# Patient Record
Sex: MALE | Race: WHITE | Employment: OTHER | URBAN - METROPOLITAN AREA
[De-identification: names, ages, dates, MRNs, and addresses within clinical notes are randomized per-mention and may not be internally consistent; named-entity substitution may affect disease eponyms.]

---

## 2017-02-10 RX ORDER — EPINEPHRINE 0.1 MG/ML
1 INJECTION INTRACARDIAC; INTRAVENOUS
Status: CANCELLED | OUTPATIENT
Start: 2017-02-10 | End: 2017-02-10

## 2017-02-10 RX ORDER — ATROPINE SULFATE 0.1 MG/ML
0.5 INJECTION INTRAVENOUS
Status: CANCELLED | OUTPATIENT
Start: 2017-02-10 | End: 2017-02-10

## 2017-02-10 RX ORDER — DEXTROMETHORPHAN/PSEUDOEPHED 2.5-7.5/.8
1.2 DROPS ORAL
Status: CANCELLED | OUTPATIENT
Start: 2017-02-10

## 2017-02-17 ENCOUNTER — HOSPITAL ENCOUNTER (OUTPATIENT)
Age: 62
Setting detail: OUTPATIENT SURGERY
Discharge: HOME | End: 2017-02-17
Attending: INTERNAL MEDICINE | Admitting: INTERNAL MEDICINE
Payer: COMMERCIAL

## 2017-02-17 ENCOUNTER — SURGERY (OUTPATIENT)
Age: 62
End: 2017-02-17

## 2017-02-17 VITALS
DIASTOLIC BLOOD PRESSURE: 75 MMHG | HEIGHT: 71 IN | HEART RATE: 64 BPM | TEMPERATURE: 96.8 F | WEIGHT: 213 LBS | SYSTOLIC BLOOD PRESSURE: 119 MMHG | RESPIRATION RATE: 16 BRPM | BODY MASS INDEX: 29.82 KG/M2 | OXYGEN SATURATION: 94 %

## 2017-02-17 LAB — GLUCOSE BLD STRIP.AUTO-MCNC: 105 MG/DL (ref 70–110)

## 2017-02-17 PROCEDURE — 74011250636 HC RX REV CODE- 250/636: Performed by: INTERNAL MEDICINE

## 2017-02-17 PROCEDURE — 74011250636 HC RX REV CODE- 250/636

## 2017-02-17 PROCEDURE — 76040000007: Performed by: INTERNAL MEDICINE

## 2017-02-17 PROCEDURE — 88305 TISSUE EXAM BY PATHOLOGIST: CPT | Performed by: INTERNAL MEDICINE

## 2017-02-17 PROCEDURE — 82962 GLUCOSE BLOOD TEST: CPT

## 2017-02-17 PROCEDURE — 77030013991 HC SNR POLYP ENDOSC BSC -A: Performed by: INTERNAL MEDICINE

## 2017-02-17 PROCEDURE — 77030020256 HC SOL INJ NACL 0.9%  500ML: Performed by: INTERNAL MEDICINE

## 2017-02-17 RX ORDER — SODIUM CHLORIDE 9 MG/ML
100 INJECTION, SOLUTION INTRAVENOUS CONTINUOUS
Status: DISCONTINUED | OUTPATIENT
Start: 2017-02-17 | End: 2017-02-17 | Stop reason: HOSPADM

## 2017-02-17 RX ORDER — METFORMIN HYDROCHLORIDE 500 MG/1
1000 TABLET ORAL 2 TIMES DAILY WITH MEALS
COMMUNITY

## 2017-02-17 RX ORDER — PIOGLITAZONEHYDROCHLORIDE 30 MG/1
30 TABLET ORAL DAILY
COMMUNITY

## 2017-02-17 RX ORDER — NALOXONE HYDROCHLORIDE 0.4 MG/ML
0.4 INJECTION, SOLUTION INTRAMUSCULAR; INTRAVENOUS; SUBCUTANEOUS
Status: DISCONTINUED | OUTPATIENT
Start: 2017-02-17 | End: 2017-02-17 | Stop reason: HOSPADM

## 2017-02-17 RX ORDER — FLUMAZENIL 0.1 MG/ML
0.2 INJECTION INTRAVENOUS
Status: DISCONTINUED | OUTPATIENT
Start: 2017-02-17 | End: 2017-02-17 | Stop reason: HOSPADM

## 2017-02-17 RX ORDER — TADALAFIL 20 MG/1
20 TABLET ORAL AS NEEDED
COMMUNITY

## 2017-02-17 RX ORDER — MIDAZOLAM HYDROCHLORIDE 1 MG/ML
.5-5 INJECTION, SOLUTION INTRAMUSCULAR; INTRAVENOUS
Status: DISCONTINUED | OUTPATIENT
Start: 2017-02-17 | End: 2017-02-17 | Stop reason: HOSPADM

## 2017-02-17 RX ORDER — SILDENAFIL 50 MG/1
50 TABLET, FILM COATED ORAL AS NEEDED
COMMUNITY
End: 2021-03-18

## 2017-02-17 RX ORDER — FENTANYL CITRATE 50 UG/ML
100 INJECTION, SOLUTION INTRAMUSCULAR; INTRAVENOUS
Status: DISCONTINUED | OUTPATIENT
Start: 2017-02-17 | End: 2017-02-17 | Stop reason: HOSPADM

## 2017-02-17 RX ORDER — PRAVASTATIN SODIUM 40 MG/1
40 TABLET ORAL
COMMUNITY

## 2017-02-17 RX ORDER — ASPIRIN 81 MG/1
81 TABLET ORAL DAILY
COMMUNITY

## 2017-02-17 RX ADMIN — FENTANYL CITRATE 100 MCG: 50 INJECTION, SOLUTION INTRAMUSCULAR; INTRAVENOUS at 14:56

## 2017-02-17 RX ADMIN — MIDAZOLAM HYDROCHLORIDE 2 MG: 1 INJECTION, SOLUTION INTRAMUSCULAR; INTRAVENOUS at 14:57

## 2017-02-17 RX ADMIN — SODIUM CHLORIDE 100 ML/HR: 900 INJECTION, SOLUTION INTRAVENOUS at 13:45

## 2017-02-17 RX ADMIN — MIDAZOLAM HYDROCHLORIDE 3 MG: 1 INJECTION, SOLUTION INTRAMUSCULAR; INTRAVENOUS at 14:56

## 2017-02-17 NOTE — PROCEDURES
Former[facility]  Colonoscopy Procedure Report  _______________________________________________________  Patient: Yudy Herron                                         Attending Physician: Tamir Horn MD    Patient ID: [id number]                                      Referring Physician: Winsome Shi MD    Exam Date: February 17, 2017 _______________________________________________________      Introduction: A  62 y.o. male patient, presents for outpatient Colonoscopy    Indications: second Screen colon cancer, average risk and asymptomatic. Last colonoscopy done 10 years ago    Consent: The benefits, risks, and alternatives to the procedure were discussed and informed consent was obtained from the patient. Preparation: EKG, pulse, pulse oximetry and blood pressure were monitored throughout the procedure. ASA Classification: Class 2 - . The heart is an S1-S2 and regular heart rate and rhythm. Lungs are clear to auscultation and percussion. Abdomen is soft, nondistended, and nontender. Mental Status: awake, alert, and oriented to person, place, and time    Medications:  · Fentanyl 100 mcg IV before procedure. · Versed 5 mg IV throughout the procedure. Rectal Exam: Normal Rectal Exam. No Blood. Prostate not enlarged. Pathology Specimens: 2 specimens removed. Procedure: The colonoscope was passed with ease through the anus under direct visualization and advanced to the cecum and 5 cm inside the terminal ileum. The scope was withdrawn and the mucosa was carefully examined. The quality of the preparation was good to fair. The views were good. The patient's toleration of the procedure was excellent. The exam was done twice to the cecum. Total time is 20 minutes and withdrawal time is 15 minutes. Findings:  Slightly long and redundant colon  Rectum:   Normal  Sigmoid:   Slightly tortuous and redundant sigmoid colon.    Descending Colon:   Normal  Transverse Colon:   4 mm sessile polyp in the transverse colon cold snared. Ascending Colon:   6 mm flat polyp in the mid ascending colon cold snared   Cecum:   Normal  Terminal Ileum:   Normal    Unplanned Events: There were no unplanned events. Estimated Blood Loss: None  Impressions:    Slightly tortuous and redundant sigmoid colon. One 6 mm flat polyp in the mid ascending colon cold snared and one 4 mm sessile polyp in the transverse colon cold snared. Normal Mucosa. No diverticula found. Complications: None; patient tolerated the procedure well. Recommendations:  · Discharge home when standard parameters are met. · Resume a high fiber diet. · Colonoscopy recommendation in 5 years with a better bowel prep.      Procedure Codes:    · [place] [[id number]]    Endoscope Information:  Model Number(s)    [id number]     Assistant: None      Signed By: Yocasta Oshea MD Date: February 17, 2017

## 2017-02-17 NOTE — DISCHARGE INSTRUCTIONS
Srikanth Lawn  [id number]  1955    COLON DISCHARGE INSTRUCTIONS    Discomfort:  Redness at IV site- apply warm compress to area; if redness or soreness persist- contact your physician  There may be a slight amount of blood passed from the rectum  Gaseous discomfort- walking, belching will help relieve any discomfort  You may not operate a vehicle til the next day. You may not engage in an occupation involving machinery or appliances til the next day. You may not drink alcoholic beverages til the next day. DIET:   High fiber diet. ACTIVITY:  You may not  resume your normal daily activities til the next day. it is recommended that you spend the remainder of the day resting -  avoid any strenuous activity. CALL M.D.  IF ANY SIGN OF:   Increasing pain, nausea, vomiting  Abdominal distension (swelling)  New increased bleeding (oral or rectal)  Fever (chills)  Pain in chest area  Bloody discharge from nose or mouth  Shortness of breath    You may not  take any Advil, Aspirin, Ibuprofen, Motrin, Aleve, or Goodys for 5 days, ONLY  Tylenol as needed for pain. Post procedure diagnosis:  POLYPS,     Follow-up Instructions: Your follow up colonoscopy will be in 5 years with a better bowel prep. We will notify you the results of your biopsy by letter within 2 weeks.     Jessenia Le MD  February 17, 2017       DISCHARGE SUMMARY from Nurse    The following personal items collected during your admission are returned to you:   Dental Appliance: Dental Appliances: None  Vision: Visual Aid: Glasses  Hearing Aid:    Jewelry:    Clothing:    Other Valuables:    Valuables sent to safe:              PATIENT INSTRUCTIONS:    After general anesthesia or intravenous sedation, for 24 hours or while taking prescription Narcotics:  · Limit your activities  · Do not drive and operate hazardous machinery  · Do not make important personal or business decisions  · Do  not drink alcoholic beverages  · If you have not urinated within 8 hours after discharge, please contact your surgeon on call. Report the following to your surgeon:  · Excessive pain, swelling, redness or odor of or around the surgical area  · Temperature over 100.5  · Nausea and vomiting lasting longer than 4 hours or if unable to take medications  · Any signs of decreased circulation or nerve impairment to extremity: change in color, persistent  numbness, tingling, coldness or increase pain  · Any questions      No orders of the defined types were placed in this encounter. What to do at Home:  Recommended activity: as above,     If you experience any of the following symptoms as above, please follow up with Dr. Renny Rivers. *  Please give a list of your current medications to your Primary Care Provider. *  Please update this list whenever your medications are discontinued, doses are      changed, or new medications (including over-the-counter products) are added. *  Please carry medication information at all times in case of emergency situations. These are general instructions for a healthy lifestyle:    No smoking/ No tobacco products/ Avoid exposure to second hand smoke    Surgeon General's Warning:  Quitting smoking now greatly reduces serious risk to your health. Obesity, smoking, and sedentary lifestyle greatly increases your risk for illness    A healthy diet, regular physical exercise & weight monitoring are important for maintaining a healthy lifestyle    You may be retaining fluid if you have a history of heart failure or if you experience any of the following symptoms:  Weight gain of 3 pounds or more overnight or 5 pounds in a week, increased swelling in our hands or feet or shortness of breath while lying flat in bed. Please call your doctor as soon as you notice any of these symptoms; do not wait until your next office visit.     Recognize signs and symptoms of STROKE:    F-face looks uneven    A-arms unable to move or move unevenly    S-speech slurred or non-existent    T-time-call 911 as soon as signs and symptoms begin-DO NOT go       Back to bed or wait to see if you get better-TIME IS BRAIN. The discharge information has been reviewed with the patient and spouse. The patient and spouse verbalized understanding. Warning Signs of HEART ATTACK     Call 911 if you have these symptoms:   Chest discomfort. Most heart attacks involve discomfort in the center of the chest that lasts more than a few minutes, or that goes away and comes back. It can feel like uncomfortable pressure, squeezing, fullness, or pain.  Discomfort in other areas of the upper body. Symptoms can include pain or discomfort in one or both arms, the back, neck, jaw, or stomach.  Shortness of breath with or without chest discomfort.  Other signs may include breaking out in a cold sweat, nausea, or lightheadedness. Don't wait more than five minutes to call 911 - MINUTES MATTER! Fast action can save your life. Calling 911 is almost always the fastest way to get lifesaving treatment. Emergency Medical Services staff can begin treatment when they arrive -- up to an hour sooner than if someone gets to the hospital by car. The discharge information has been reviewed with the patient and caregiver. The patient and caregiver verbalized understanding. Discharge medications reviewed with the patient and guardian and appropriate educational materials and side effects teaching were provided.     Patient armband removed and shredded

## 2017-02-17 NOTE — IP AVS SNAPSHOT
Afshin Thornton 
 
 
 [address] [place] [id number] 
[phone] Patient: Jorge Luis Amaral MRN: [id number] PII:[id number]/4378 You are allergic to the following Allergen Reactions Pcn (Penicillins) Other (comments)  
 recieved injection and the area swelled and left a scar Zocor (Simvastatin) Myalgia Recent Documentation Height Weight BMI Smoking Status 1.803 m 96.6 kg 29.71 kg/m2 Never Smoker About your hospitalization You were admitted on:  February 17, 2017 You last received care in the:  [facility] ENDOSCOPY You were discharged on:  February 17, 2017 Unit phone number:  [phone] Why you were hospitalized Your primary diagnosis was:  Not on File Providers Seen During Your Hospitalizations Provider Role Specialty Primary office phone Robert Raza MD Attending Provider Gastroenterology [phone] Your Primary Care Physician (PCP) Primary Care Physician Office Phone Office Fax [facility] [address] [place] [address], [address] [place] [address] [place] [phone] Follow-up Information Follow up With Details Comments Contact Info Didi Vines MD   [address] 
[phone] Current Discharge Medication List  
  
CONTINUE these medications which have NOT CHANGED Dose & Instructions Dispensing Information Comments Morning Noon Evening Bedtime ACTOS 30 mg tablet Generic drug:  pioglitazone Your next dose is: Today, Tomorrow Other:  _________ Dose:  30 mg Take 30 mg by mouth daily. Refills:  0  
     
   
   
   
  
 aspirin delayed-release 81 mg tablet Your next dose is: Today, Tomorrow Other:  _________ Dose:  81 mg Take 81 mg by mouth daily. Refills:  0 CIALIS 20 mg tablet Generic drug:  tadalafil Your next dose is: Today, Tomorrow Other:  _________ Dose:  20 mg Take 20 mg by mouth as needed. Refills:  0  
     
   
   
   
  
 metFORMIN 500 mg tablet Commonly known as:  GLUCOPHAGE Your next dose is: Today, Tomorrow Other:  _________ Dose:  500 mg Take 500 mg by mouth two (2) times daily (with meals). Refills:  0 PRAVACHOL 40 mg tablet Generic drug:  pravastatin Your next dose is: Today, Tomorrow Other:  _________ Dose:  40 mg Take 40 mg by mouth nightly. Refills:  0 VIAGRA 50 mg tablet Generic drug:  sildenafil citrate Your next dose is: Today, Tomorrow Other:  _________ Dose:  50 mg Take 50 mg by mouth as needed. Refills:  0 Discharge Instructions Jazminjessica Harman [id number] 
1955 COLON DISCHARGE INSTRUCTIONS Discomfort: 
Redness at IV site- apply warm compress to area; if redness or soreness persist- contact your physician There may be a slight amount of blood passed from the rectum Gaseous discomfort- walking, belching will help relieve any discomfort You may not operate a vehicle til the next day. You may not engage in an occupation involving machinery or appliances til the next day. You may not drink alcoholic beverages til the next day. DIET: 
 High fiber diet. ACTIVITY: 
You may not  resume your normal daily activities til the next day. it is recommended that you spend the remainder of the day resting -  avoid any strenuous activity. CALL BENY Luu ANY SIGN OF: Increasing pain, nausea, vomiting Abdominal distension (swelling) New increased bleeding (oral or rectal) Fever (chills) Pain in chest area Bloody discharge from nose or mouth Shortness of breath You may not  take any Advil, Aspirin, Ibuprofen, Motrin, Aleve, or Goodys for 5 days, ONLY  Tylenol as needed for pain. Post procedure diagnosis:  POLYPS, Follow-up Instructions: Your follow up colonoscopy will be in 5 years with a better bowel prep. We will notify you the results of your biopsy by letter within 2 weeks. Gareth Alfonso MD 
February 17, 2017 DISCHARGE SUMMARY from Nurse The following personal items collected during your admission are returned to you:  
Dental Appliance: Dental Appliances: None Vision: Visual Aid: Glasses Hearing Aid:   
Jewelry:   
Clothing:   
Other Valuables:   
Valuables sent to safe:   
 
 
 
 
 
PATIENT INSTRUCTIONS: 
 
 
F-face looks uneven A-arms unable to move or move unevenly S-speech slurred or non-existent T-time-call 911 as soon as signs and symptoms begin-DO NOT go Back to bed or wait to see if you get better-TIME IS BRAIN. The discharge information has been reviewed with the patient and spouse. The patient and spouse verbalized understanding. Warning Signs of HEART ATTACK Call 911 if you have these symptoms: 
? Chest discomfort. Most heart attacks involve discomfort in the center of the chest that lasts more than a few minutes, or that goes away and comes back. It can feel like uncomfortable pressure, squeezing, fullness, or pain. ? Discomfort in other areas of the upper body. Symptoms can include pain or discomfort in one or both arms, the back, neck, jaw, or stomach. ? Shortness of breath with or without chest discomfort. ? Other signs may include breaking out in a cold sweat, nausea, or lightheadedness. Don't wait more than five minutes to call [address]!  Fast action can save your life. Calling 911 is almost always the fastest way to get lifesaving treatment. Emergency Medical Services staff can begin treatment when they arrive  up to an hour sooner than if someone gets to the hospital by car. The discharge information has been reviewed with the patient and caregiver. The patient and caregiver verbalized understanding. Discharge medications reviewed with the patient and guardian and appropriate educational materials and side effects teaching were provided. Patient armband removed and shredded Discharge Orders None Introducing [facility]! Alexus Abe introduces [employer] patient portal. Now you can access parts of your medical record, email your doctor's office, and request medication refills online. 1. In your internet browser, go to [email or web address] 2. Click on the First Time User? Click Here link in the Sign In box. You will see the New Member Sign Up page. 3. Enter your [employer] Access Code exactly as it appears below. You will not need to use this code after youve completed the sign-up process. If you do not sign up before the expiration date, you must request a new code. · [employer] Access Code: [email or web address] Expires: 5/10/2017  6:15 AM 
 
4. Enter the last four digits of your Social Security Number (xxxx) and Date of Birth (mm/dd/yyyy) as indicated and click Submit. You will be taken to the next sign-up page. 5. Create a [employer] ID. This will be your [employer] login ID and cannot be changed, so think of one that is secure and easy to remember. 6. Create a [employer] password. You can change your password at any time. 7. Enter your Password Reset Question and Answer. This can be used at a later time if you forget your password. 8. Enter your e-mail address. You will receive e-mail notification when new information is available in [address] 9. Click Sign Up. You can now view and download portions of your medical record. 10. Click the Download Summary menu link to download a portable copy of your medical information. If you have questions, please visit the Frequently Asked Questions section of the [employer] website. Remember, [employer] is NOT to be used for urgent needs. For medical emergencies, dial 911. Now available from your iPhone and Android! General Information Please provide this summary of care documentation to your next provider. Patient Signature:  ____________________________________________________________ Date:  ____________________________________________________________  
  
[facility] Provider Signature:  ____________________________________________________________ Date:  ____________________________________________________________

## 2019-12-13 ENCOUNTER — HOSPITAL ENCOUNTER (OUTPATIENT)
Dept: CT IMAGING | Age: 64
Discharge: HOME | End: 2019-12-13
Attending: OPHTHALMOLOGY
Payer: COMMERCIAL

## 2019-12-13 DIAGNOSIS — H53.482: ICD-10-CM

## 2019-12-13 LAB — CREAT UR-MCNC: 0.8 MG/DL (ref 0.6–1.3)

## 2019-12-13 PROCEDURE — 82565 ASSAY OF CREATININE: CPT

## 2019-12-13 PROCEDURE — 70470 CT HEAD/BRAIN W/O & W/DYE: CPT

## 2019-12-16 PROCEDURE — 74011636320 HC RX REV CODE- 636/320: Performed by: OPHTHALMOLOGY

## 2019-12-16 RX ADMIN — IOPAMIDOL 100 ML: 612 INJECTION, SOLUTION INTRAVENOUS at 08:00

## 2021-03-18 ENCOUNTER — HOSPITAL ENCOUNTER (EMERGENCY)
Age: 66
Discharge: HOME | End: 2021-03-19
Attending: EMERGENCY MEDICINE
Payer: MEDICARE

## 2021-03-18 DIAGNOSIS — N20.0 KIDNEY STONE: Primary | ICD-10-CM

## 2021-03-18 LAB
BASOPHILS # BLD: 0 K/UL (ref 0–0.1)
BASOPHILS NFR BLD: 0 % (ref 0–2)
DIFFERENTIAL METHOD BLD: ABNORMAL
EOSINOPHIL # BLD: 0 K/UL (ref 0–0.4)
EOSINOPHIL NFR BLD: 0 % (ref 0–5)
ERYTHROCYTE [DISTWIDTH] IN BLOOD BY AUTOMATED COUNT: 13.9 % (ref 11.6–14.5)
HCT VFR BLD AUTO: 42.5 % (ref 36–48)
HGB BLD-MCNC: 14.2 G/DL (ref 13–16)
LYMPHOCYTES # BLD: 0.8 K/UL (ref 0.9–3.6)
LYMPHOCYTES NFR BLD: 8 % (ref 21–52)
MCH RBC QN AUTO: 31.7 PG (ref 24–34)
MCHC RBC AUTO-ENTMCNC: 33.4 G/DL (ref 31–37)
MCV RBC AUTO: 94.9 FL (ref 74–97)
MONOCYTES # BLD: 0.8 K/UL (ref 0.05–1.2)
MONOCYTES NFR BLD: 8 % (ref 3–10)
NEUTS SEG # BLD: 8.5 K/UL (ref 1.8–8)
NEUTS SEG NFR BLD: 84 % (ref 40–73)
PLATELET # BLD AUTO: 255 K/UL (ref 135–420)
PMV BLD AUTO: 10 FL (ref 9.2–11.8)
RBC # BLD AUTO: 4.48 M/UL (ref 4.7–5.5)
WBC # BLD AUTO: 10.1 K/UL (ref 4.6–13.2)

## 2021-03-18 PROCEDURE — 80053 COMPREHEN METABOLIC PANEL: CPT

## 2021-03-18 PROCEDURE — 85025 COMPLETE CBC W/AUTO DIFF WBC: CPT

## 2021-03-18 PROCEDURE — 96375 TX/PRO/DX INJ NEW DRUG ADDON: CPT

## 2021-03-18 PROCEDURE — 96374 THER/PROPH/DIAG INJ IV PUSH: CPT

## 2021-03-18 PROCEDURE — 83690 ASSAY OF LIPASE: CPT

## 2021-03-18 PROCEDURE — 99283 EMERGENCY DEPT VISIT LOW MDM: CPT

## 2021-03-18 PROCEDURE — 81001 URINALYSIS AUTO W/SCOPE: CPT

## 2021-03-18 PROCEDURE — 87086 URINE CULTURE/COLONY COUNT: CPT

## 2021-03-18 PROCEDURE — 96361 HYDRATE IV INFUSION ADD-ON: CPT

## 2021-03-18 RX ORDER — BIOTIN 5 MG
5 TABLET ORAL DAILY
COMMUNITY

## 2021-03-18 RX ORDER — TESTOSTERONE 10 MG/.5G
GEL, METERED TOPICAL
COMMUNITY

## 2021-03-19 ENCOUNTER — APPOINTMENT (OUTPATIENT)
Dept: CT IMAGING | Age: 66
End: 2021-03-19
Attending: EMERGENCY MEDICINE
Payer: MEDICARE

## 2021-03-19 VITALS
WEIGHT: 208 LBS | SYSTOLIC BLOOD PRESSURE: 160 MMHG | TEMPERATURE: 98.4 F | RESPIRATION RATE: 16 BRPM | BODY MASS INDEX: 29.12 KG/M2 | HEIGHT: 71 IN | OXYGEN SATURATION: 99 % | HEART RATE: 77 BPM | DIASTOLIC BLOOD PRESSURE: 69 MMHG

## 2021-03-19 LAB
ALBUMIN SERPL-MCNC: 3.3 G/DL (ref 3.4–5)
ALBUMIN SERPL-MCNC: 3.9 G/DL (ref 3.4–5)
ALBUMIN/GLOB SERPL: 1.2 {RATIO} (ref 0.8–1.7)
ALBUMIN/GLOB SERPL: 1.2 {RATIO} (ref 0.8–1.7)
ALP SERPL-CCNC: 53 U/L (ref 45–117)
ALP SERPL-CCNC: 66 U/L (ref 45–117)
ALT SERPL-CCNC: 20 U/L (ref 16–61)
ALT SERPL-CCNC: 24 U/L (ref 16–61)
AMORPH CRY URNS QL MICRO: ABNORMAL
ANION GAP SERPL CALC-SCNC: 4 MMOL/L (ref 3–18)
ANION GAP SERPL CALC-SCNC: 5 MMOL/L (ref 3–18)
APPEARANCE UR: ABNORMAL
AST SERPL-CCNC: 14 U/L (ref 10–38)
AST SERPL-CCNC: 14 U/L (ref 10–38)
BACTERIA URNS QL MICRO: ABNORMAL /HPF
BILIRUB SERPL-MCNC: 1 MG/DL (ref 0.2–1)
BILIRUB SERPL-MCNC: 1.1 MG/DL (ref 0.2–1)
BILIRUB UR QL: NEGATIVE
BUN SERPL-MCNC: 14 MG/DL (ref 7–18)
BUN SERPL-MCNC: 15 MG/DL (ref 7–18)
BUN/CREAT SERPL: 10 (ref 12–20)
BUN/CREAT SERPL: 9 (ref 12–20)
CALCIUM SERPL-MCNC: 8.6 MG/DL (ref 8.5–10.1)
CALCIUM SERPL-MCNC: 9.5 MG/DL (ref 8.5–10.1)
CHLORIDE SERPL-SCNC: 101 MMOL/L (ref 100–111)
CHLORIDE SERPL-SCNC: 106 MMOL/L (ref 100–111)
CO2 SERPL-SCNC: 27 MMOL/L (ref 21–32)
CO2 SERPL-SCNC: 29 MMOL/L (ref 21–32)
COLOR UR: YELLOW
CREAT SERPL-MCNC: 1.46 MG/DL (ref 0.6–1.3)
CREAT SERPL-MCNC: 1.53 MG/DL (ref 0.6–1.3)
EPITH CASTS URNS QL MICRO: ABNORMAL /LPF (ref 0–5)
GLOBULIN SER CALC-MCNC: 2.7 G/DL (ref 2–4)
GLOBULIN SER CALC-MCNC: 3.2 G/DL (ref 2–4)
GLUCOSE SERPL-MCNC: 121 MG/DL (ref 74–99)
GLUCOSE SERPL-MCNC: 143 MG/DL (ref 74–99)
GLUCOSE UR STRIP.AUTO-MCNC: 500 MG/DL
HGB UR QL STRIP: NEGATIVE
KETONES UR QL STRIP.AUTO: 15 MG/DL
LEUKOCYTE ESTERASE UR QL STRIP.AUTO: NEGATIVE
LIPASE SERPL-CCNC: 55 U/L (ref 73–393)
NITRITE UR QL STRIP.AUTO: NEGATIVE
PH UR STRIP: 7 [PH] (ref 5–8)
POTASSIUM SERPL-SCNC: 4.2 MMOL/L (ref 3.5–5.5)
POTASSIUM SERPL-SCNC: 4.3 MMOL/L (ref 3.5–5.5)
PROT SERPL-MCNC: 6 G/DL (ref 6.4–8.2)
PROT SERPL-MCNC: 7.1 G/DL (ref 6.4–8.2)
PROT UR STRIP-MCNC: 30 MG/DL
RBC #/AREA URNS HPF: NEGATIVE /HPF (ref 0–5)
SODIUM SERPL-SCNC: 135 MMOL/L (ref 136–145)
SODIUM SERPL-SCNC: 137 MMOL/L (ref 136–145)
SP GR UR REFRACTOMETRY: 1.02 (ref 1–1.03)
UROBILINOGEN UR QL STRIP.AUTO: 1 EU/DL (ref 0.2–1)
WBC URNS QL MICRO: ABNORMAL /HPF (ref 0–5)

## 2021-03-19 PROCEDURE — 96376 TX/PRO/DX INJ SAME DRUG ADON: CPT

## 2021-03-19 PROCEDURE — 74177 CT ABD & PELVIS W/CONTRAST: CPT

## 2021-03-19 PROCEDURE — 96361 HYDRATE IV INFUSION ADD-ON: CPT

## 2021-03-19 PROCEDURE — 96374 THER/PROPH/DIAG INJ IV PUSH: CPT

## 2021-03-19 PROCEDURE — 74011250636 HC RX REV CODE- 250/636: Performed by: EMERGENCY MEDICINE

## 2021-03-19 PROCEDURE — 80053 COMPREHEN METABOLIC PANEL: CPT

## 2021-03-19 PROCEDURE — 74011000636 HC RX REV CODE- 636: Performed by: EMERGENCY MEDICINE

## 2021-03-19 PROCEDURE — 74011250637 HC RX REV CODE- 250/637: Performed by: EMERGENCY MEDICINE

## 2021-03-19 RX ORDER — TAMSULOSIN HYDROCHLORIDE 0.4 MG/1
0.4 CAPSULE ORAL ONCE
Status: COMPLETED | OUTPATIENT
Start: 2021-03-19 | End: 2021-03-19

## 2021-03-19 RX ORDER — ONDANSETRON 4 MG/1
4 TABLET, ORALLY DISINTEGRATING ORAL
Qty: 10 TAB | Refills: 0 | Status: SHIPPED | OUTPATIENT
Start: 2021-03-19

## 2021-03-19 RX ORDER — MORPHINE SULFATE 2 MG/ML
2 INJECTION, SOLUTION INTRAMUSCULAR; INTRAVENOUS
Status: COMPLETED | OUTPATIENT
Start: 2021-03-19 | End: 2021-03-19

## 2021-03-19 RX ORDER — HYDROCODONE BITARTRATE AND ACETAMINOPHEN 5; 325 MG/1; MG/1
1 TABLET ORAL
Qty: 10 TAB | Refills: 0 | Status: SHIPPED | OUTPATIENT
Start: 2021-03-19 | End: 2021-03-23

## 2021-03-19 RX ORDER — NITROFURANTOIN 25; 75 MG/1; MG/1
100 CAPSULE ORAL 2 TIMES DAILY
Qty: 6 CAP | Refills: 0 | Status: SHIPPED | OUTPATIENT
Start: 2021-03-19 | End: 2021-03-23 | Stop reason: CLARIF

## 2021-03-19 RX ORDER — MORPHINE SULFATE 4 MG/ML
4 INJECTION INTRAVENOUS
Status: COMPLETED | OUTPATIENT
Start: 2021-03-19 | End: 2021-03-19

## 2021-03-19 RX ORDER — TAMSULOSIN HYDROCHLORIDE 0.4 MG/1
0.4 CAPSULE ORAL DAILY
Qty: 7 CAP | Refills: 0 | Status: SHIPPED | OUTPATIENT
Start: 2021-03-19 | End: 2021-03-26

## 2021-03-19 RX ADMIN — IOPAMIDOL 70 ML: 612 INJECTION, SOLUTION INTRAVENOUS at 02:46

## 2021-03-19 RX ADMIN — MORPHINE SULFATE 2 MG: 2 INJECTION, SOLUTION INTRAMUSCULAR; INTRAVENOUS at 01:40

## 2021-03-19 RX ADMIN — SODIUM CHLORIDE 1000 ML: 900 INJECTION, SOLUTION INTRAVENOUS at 04:28

## 2021-03-19 RX ADMIN — MORPHINE SULFATE 4 MG: 4 INJECTION INTRAVENOUS at 04:31

## 2021-03-19 RX ADMIN — SODIUM CHLORIDE 1000 ML: 900 INJECTION, SOLUTION INTRAVENOUS at 01:40

## 2021-03-19 RX ADMIN — TAMSULOSIN HYDROCHLORIDE 0.4 MG: 0.4 CAPSULE ORAL at 04:28

## 2021-03-19 NOTE — ED PROVIDER NOTES
EMERGENCY DEPARTMENT HISTORY AND PHYSICAL EXAM    Date: 3/18/2021  Patient Name: Jay Francisco    History of Presenting Illness     Chief Complaint   Patient presents with    Abdominal Pain         History Provided By: Patient    0100  Jay Francisco is a 73 y.o. male with PMHX of diabetes, GERD who presents to the emergency department C/O lower abdominal pain ongoing for the past day. The patient reports that he ate toast this morning with some jam and has since then not had any desire to eat. He reports pain however denies nausea, blood in the stool, chest pain, fever, chills, shortness of breath. States that the pain is primarily on the left side. Denies history of kidney stones. No inciting or relieving factors. PCP: Jennie Montoya MD    Current Outpatient Medications   Medication Sig Dispense Refill    nitrofurantoin, macrocrystal-monohydrate, (Macrobid) 100 mg capsule Take 1 Cap by mouth two (2) times a day for 3 days. 6 Cap 0    ondansetron (Zofran ODT) 4 mg disintegrating tablet Take 1 Tab by mouth every eight (8) hours as needed for Nausea or Vomiting for up to 10 doses. 10 Tab 0    HYDROcodone-acetaminophen (Norco) 5-325 mg per tablet Take 1 Tab by mouth every six (6) hours as needed for Pain for up to 10 doses. Max Daily Amount: 4 Tabs. 10 Tab 0    tamsulosin (Flomax) 0.4 mg capsule Take 1 Cap by mouth daily for 7 days. 7 Cap 0    testosterone Nestora Blight) 10 mg/0.5 gram /actuation glpm by TransDERmal route.  biotin (VITAMIN B7) 5 mg tablet Take 5 mg by mouth daily.  pravastatin (PRAVACHOL) 40 mg tablet Take 40 mg by mouth nightly.  pioglitazone (ACTOS) 30 mg tablet Take 30 mg by mouth daily.  metFORMIN (GLUCOPHAGE) 500 mg tablet Take 1,000 mg by mouth two (2) times daily (with meals).  aspirin delayed-release 81 mg tablet Take 81 mg by mouth daily.  tadalafil (CIALIS) 20 mg tablet Take 20 mg by mouth as needed.          Past History     Past Medical History:  Past Medical History:   Diagnosis Date    Diabetes ([facility] [address]     GERD (gastroesophageal reflux disease)        Past Surgical History:  Past Surgical History:   Procedure Laterality Date    COLONOSCOPY N/A 2/17/2017    COLONOSCOPY, POLYPECTOMY performed by Manjinder Malin MD at [address] HX HEENT      titanium plate and tooth    HX HERNIA REPAIR Right        Family History:  History reviewed. No pertinent family history. Social History:  Social History     Tobacco Use    Smoking status: Never Smoker    Smokeless tobacco: Never Used   Substance Use Topics    Alcohol use: Yes     Alcohol/week: 21.0 standard drinks     Types: 14 Cans of beer, 7 Shots of liquor per week     Comment: 2 beers, 1 shot    Drug use: No       Allergies: Allergies   Allergen Reactions    Pcn [Penicillins] Other (comments)     recieved injection and the area swelled and left a scar    Zocor [Simvastatin] Myalgia         Review of Systems   Review of Systems   Constitutional: Negative for chills and fever. Respiratory: Negative for shortness of breath. Cardiovascular: Negative for chest pain. Gastrointestinal: Positive for abdominal pain. Negative for diarrhea, nausea and vomiting. All other systems reviewed and are negative.       Physical Exam     Vitals:    03/18/21 2100 03/18/21 2348 03/19/21 0142   BP: (!) 160/69     Pulse: 77     Resp: 16     Temp: 98.4 °F (36.9 °C)     SpO2: 98% 96% 99%   Weight: 94.3 kg (208 lb)     Height: 5' 11\" (1.803 m)       Physical Exam    Nursing notes and vital signs reviewed    Constitutional: Non toxic appearing, moderate distress  Head: Normocephalic, Atraumatic  Eyes: EOMI  Neck: Supple  Cardiovascular: Regular rate and rhythm, no murmurs, rubs, or gallops  Chest: Normal work of breathing and chest excursion bilaterally  Lungs: Clear to ausculation bilaterally  Abdomen: Soft, suprapubic and LLQ tenderness, non distended, normoactive bowel sounds  Back: No evidence of trauma or deformity  Extremities: No evidence of trauma or deformity, no LE edema  Skin: Warm and dry, normal cap refill  Neuro: Alert and appropriate, normal speech, strength and sensation full and symmetric bilaterally, normal gait, normal coordination  Psychiatric: Normal mood and affect      Diagnostic Study Results     Labs -     Recent Results (from the past 12 hour(s))   CBC WITH AUTOMATED DIFF    Collection Time: 03/18/21 11:04 PM   Result Value Ref Range    WBC 10.1 4.6 - 13.2 K/uL    RBC 4.48 (L) 4.70 - 5.50 M/uL    HGB 14.2 13.0 - 16.0 g/dL    HCT 42.5 36.0 - 48.0 %    MCV 94.9 74.0 - 97.0 FL    MCH 31.7 24.0 - 34.0 PG    MCHC 33.4 31.0 - 37.0 g/dL    RDW 13.9 11.6 - 14.5 %    PLATELET 080 559 - 301 K/uL    MPV 10.0 9.2 - 11.8 FL    NEUTROPHILS 84 (H) 40 - 73 %    LYMPHOCYTES 8 (L) 21 - 52 %    MONOCYTES 8 3 - 10 %    EOSINOPHILS 0 0 - 5 %    BASOPHILS 0 0 - 2 %    ABS. NEUTROPHILS 8.5 (H) 1.8 - 8.0 K/UL    ABS. LYMPHOCYTES 0.8 (L) 0.9 - 3.6 K/UL    ABS. MONOCYTES 0.8 0.05 - 1.2 K/UL    ABS. EOSINOPHILS 0.0 0.0 - 0.4 K/UL    ABS. BASOPHILS 0.0 0.0 - 0.1 K/UL    DF AUTOMATED     METABOLIC PANEL, COMPREHENSIVE    Collection Time: 03/18/21 11:04 PM   Result Value Ref Range    Sodium 135 (L) 136 - 145 mmol/L    Potassium 4.2 3.5 - 5.5 mmol/L    Chloride 101 100 - 111 mmol/L    CO2 29 21 - 32 mmol/L    Anion gap 5 3.0 - 18 mmol/L    Glucose 143 (H) 74 - 99 mg/dL    BUN 15 7.0 - 18 MG/DL    Creatinine 1.46 (H) 0.6 - 1.3 MG/DL    BUN/Creatinine ratio 10 (L) 12 - 20      GFR est AA 59 (L) >60 ml/min/1.73m2    GFR est non-AA 48 (L) >60 ml/min/1.73m2    Calcium 9.5 8.5 - 10.1 MG/DL    Bilirubin, total 1.0 0.2 - 1.0 MG/DL    ALT (SGPT) 24 16 - 61 U/L    AST (SGOT) 14 10 - 38 U/L    Alk.  phosphatase 66 45 - 117 U/L    Protein, total 7.1 6.4 - 8.2 g/dL    Albumin 3.9 3.4 - 5.0 g/dL    Globulin 3.2 2.0 - 4.0 g/dL    A-G Ratio 1.2 0.8 - 1.7     LIPASE    Collection Time: 03/18/21 11:04 PM   Result Value Ref Range Lipase 55 (L) 73 - 393 U/L   URINALYSIS W/ RFLX MICROSCOPIC    Collection Time: 03/18/21 11:45 PM   Result Value Ref Range    Color YELLOW      Appearance TURBID      Specific gravity 1.024 1.005 - 1.030      pH (UA) 7.0 5.0 - 8.0      Protein 30 (A) NEG mg/dL    Glucose 500 (A) NEG mg/dL    Ketone 15 (A) NEG mg/dL    Bilirubin Negative NEG      Blood Negative NEG      Urobilinogen 1.0 0.2 - 1.0 EU/dL    Nitrites Negative NEG      Leukocyte Esterase Negative NEG     URINE MICROSCOPIC ONLY    Collection Time: 03/18/21 11:45 PM   Result Value Ref Range    WBC 0 to 3 0 - 5 /hpf    RBC Negative 0 - 5 /hpf    Epithelial cells FEW 0 - 5 /lpf    Bacteria 2+ (A) NEG /hpf    Amorphous Crystals 1+ (A) NEG   METABOLIC PANEL, COMPREHENSIVE    Collection Time: 03/19/21  5:40 AM   Result Value Ref Range    Sodium 137 136 - 145 mmol/L    Potassium 4.3 3.5 - 5.5 mmol/L    Chloride 106 100 - 111 mmol/L    CO2 27 21 - 32 mmol/L    Anion gap 4 3.0 - 18 mmol/L    Glucose 121 (H) 74 - 99 mg/dL    BUN 14 7.0 - 18 MG/DL    Creatinine 1.53 (H) 0.6 - 1.3 MG/DL    BUN/Creatinine ratio 9 (L) 12 - 20      GFR est AA 56 (L) >60 ml/min/1.73m2    GFR est non-AA 46 (L) >60 ml/min/1.73m2    Calcium 8.6 8.5 - 10.1 MG/DL    Bilirubin, total 1.1 (H) 0.2 - 1.0 MG/DL    ALT (SGPT) 20 16 - 61 U/L    AST (SGOT) 14 10 - 38 U/L    Alk. phosphatase 53 45 - 117 U/L    Protein, total 6.0 (L) 6.4 - 8.2 g/dL    Albumin 3.3 (L) 3.4 - 5.0 g/dL    Globulin 2.7 2.0 - 4.0 g/dL    A-G Ratio 1.2 0.8 - 1.7         Radiologic Studies -   CT ABD PELV W CONT   Final Result      11 x 5 x 8 mm proximal left ureteric calculus with mild proximal left   hydroureter and hydronephrosis. Nonobstructing 4 mm calculus upper left kidney. Small umbilical hernia containing fat.               CT Results  (Last 48 hours)               03/19/21 0316  CT ABD PELV W CONT Final result    Impression:      11 x 5 x 8 mm proximal left ureteric calculus with mild proximal left hydroureter and hydronephrosis. Nonobstructing 4 mm calculus upper left kidney. Small umbilical hernia containing fat. Narrative:  EXAM: CT of the abdomen and pelvis       INDICATION: Pain. COMPARISON: None. TECHNIQUE: Axial CT imaging of the abdomen and pelvis was performed with   intravenous contrast. Multiplanar reformats were generated. Dose reduction   techniques used: automated exposure control, adjustment of the mAs and/or kVp   according to patient size, and iterative reconstruction techniques. Digital   imaging and communications in Medicine (DICOM) format image data are available   to nonaffiliated external healthcare facilities or entities on a secure, media   free, reciprocally searchable basis with patient authorization for at least 12   month after the study. _______________       FINDINGS:       LOWER CHEST: There is atelectatic change/scarring at the lung bases. LIVER, BILIARY: Liver is normal. No biliary dilation. Gallbladder is   unremarkable. PANCREAS: Normal.       SPLEEN: Normal.       ADRENALS: Normal.       KIDNEYS: There is a 2 cm low-density structure mid pole left kidney with low   attenuation values consistent with that of a cyst. There is a 4 mm calculus   upper left kidney. There is left perinephric stranding as well as mild left   hydronephrosis and proximal left hydroureter to the level of an 11 x 5 x 8 mm   proximal left ureteric calculus. LYMPH NODES: No enlarged lymph nodes. GASTROINTESTINAL TRACT: No bowel dilation or wall thickening. PELVIC ORGANS: Unremarkable. VASCULATURE: There is mild atherosclerotic plaque of the abdominal aorta and   proximal branches. BONES: There is moderate L4-5 disc degenerative change.        OTHER: There is a small umbilical hernia containing fat.       _______________               CXR Results  (Last 48 hours)    None          Medications given in the ED-  Medications sodium chloride 0.9 % bolus infusion 1,000 mL (0 mL IntraVENous IV Completed 3/19/21 0240)   morphine injection 2 mg (2 mg IntraVENous Given 3/19/21 0140)   iopamidoL (ISOVUE 300) 61 % contrast injection  mL (70 mL IntraVENous Given 3/19/21 0246)   sodium chloride 0.9 % bolus infusion 1,000 mL (0 mL IntraVENous IV Completed 3/19/21 0525)   tamsulosin (FLOMAX) capsule 0.4 mg (0.4 mg Oral Given 3/19/21 0428)   morphine injection 4 mg (4 mg IntraVENous Given 3/19/21 0431)         Medical Decision Making   I am the first provider for this patient. I reviewed the vital signs, available nursing notes, past medical history, past surgical history, family history and social history. Vital Signs-Reviewed the patient's vital signs. Pulse Oximetry Analysis - 99% on room air, not hypoxic       Records Reviewed: Nursing Notes    Provider Notes (Medical Decision Making): Nicholas Kussmaul is a 73 y.o. male with history of GERD and diabetes presenting with lower abdominal pain primarily on the left side ongoing since this morning with no inciting or relieving factors. Patient denies history of kidney stones. On exam he is in moderate distress with suprapubic and left lower quadrant tenderness. No rebound or guarding. Will obtain CT, hydrate, pain control, reassess. Patient instructed to hold Metformin for 48 hours. Procedures:  Procedures    ED Course:   Patient with mildly elevated creatinine on initial labs. Unable to find previous Cr level. Will reassess kidney function after IV hydration. 0430 patient complaining of mild pain. States he would like to try low-dose morphine. Kidney function relatively unchanged after IV hydration however not improved. Suspect underlying chronic kidney disease versus elevation due to large stone. Discussed results with patient and wife. Complaining of pain, will give morphine. We will plan for urology consult. Aware he will likely need surgery to pass the stone. CONSULT NOTE:   0650 am  Dr. Dinah Ramirez spoke with Dr. Joni Saenz  Specialty: Urology  Discussed pt's hx, disposition, and available diagnostic and imaging results over the telephone. Reviewed care plans. Discussed imaging, lab findings. He is to follow-up in clinic on Monday with plan for surgical intervention. Recommending pain control. Patient and wife made aware of consult plan. Patient to be discharged on Flomax, pain control. We will also give antibiotics given the size of the stone. Patient given close return precautions should he experience any worsening pain, any fever, chills, nausea, vomiting, urinary symptoms, or any other concerns. Patient in agreement with discharge plan and return precautions. Diagnosis and Disposition       DISCHARGE NOTE:    Hal Hernandez  results have been reviewed with him. He has been counseled regarding his diagnosis, treatment, and plan. He verbally conveys understanding and agreement of the signs, symptoms, diagnosis, treatment and prognosis and additionally agrees to follow up as discussed. He also agrees with the care-plan and conveys that all of his questions have been answered. I have also provided discharge instructions for him that include: educational information regarding their diagnosis and treatment, and list of reasons why they would want to return to the ED prior to their follow-up appointment, should his condition change. He has been provided with education for proper emergency department utilization. CLINICAL IMPRESSION:    1. Kidney stone        PLAN:  1. D/C Home  2. Current Discharge Medication List      START taking these medications    Details   nitrofurantoin, macrocrystal-monohydrate, (Macrobid) 100 mg capsule Take 1 Cap by mouth two (2) times a day for 3 days.   Qty: 6 Cap, Refills: 0      ondansetron (Zofran ODT) 4 mg disintegrating tablet Take 1 Tab by mouth every eight (8) hours as needed for Nausea or Vomiting for up to 10 doses.  Qty: 10 Tab, Refills: 0      HYDROcodone-acetaminophen (Norco) 5-325 mg per tablet Take 1 Tab by mouth every six (6) hours as needed for Pain for up to 10 doses. Max Daily Amount: 4 Tabs. Qty: 10 Tab, Refills: 0    Associated Diagnoses: Kidney stone      tamsulosin (Flomax) 0.4 mg capsule Take 1 Cap by mouth daily for 7 days. Qty: 7 Cap, Refills: 0           3. Follow-up Information     Follow up With Specialties Details Why Almaz Holland MD Urology Schedule an appointment as soon as possible for a visit   [facility]– [place][note 2][address] [id number] [phone]      [facility] EMERGENCY DEPT Emergency Medicine  If symptoms worsen [address] [id number]  [phone]        _______________________________      Please note that this dictation was completed with Taptu, the computer voice recognition software. Quite often unanticipated grammatical, syntax, homophones, and other interpretive errors are inadvertently transcribed by the computer software. Please disregard these errors. Please excuse any errors that have escaped final proofreading.

## 2021-03-19 NOTE — ED TRIAGE NOTES
Pt to ED for lower mid abdominal pain for 10hr, pt reports pain started after BM today. Pt denies N/V/D. Pt denies hx of abdominal issues.

## 2021-03-19 NOTE — DISCHARGE INSTRUCTIONS
Take Macrobid as directed. Take Zofran as needed for nausea. Hydrate well. Take over-the-counter pain medication according to bottle instructions as needed for mild to moderate pain. Take Norco as needed for severe pain. Do not drive, operate machinery, or drink alcohol while taking this medication. Please note that Norco contains acetaminophen. Do not exceed 4 g of acetaminophen in 24 hours. Take Flomax daily as directed. Follow-up outpatient with urology. Return to the emergency department if you experience any worsening pain, any fever, chills, nausea, vomiting, abdominal pain, back pain, urinary symptoms, or any other concerns.

## 2021-03-20 LAB
BACTERIA SPEC CULT: NORMAL
SERVICE CMNT-IMP: NORMAL

## 2021-03-22 ENCOUNTER — HOSPITAL ENCOUNTER (OUTPATIENT)
Dept: PREADMISSION TESTING | Age: 66
Discharge: HOME | End: 2021-03-22
Payer: MEDICARE

## 2021-03-22 LAB
EST. AVERAGE GLUCOSE BLD GHB EST-MCNC: 137 MG/DL
HBA1C MFR BLD: 6.4 % (ref 4.2–5.6)

## 2021-03-22 PROCEDURE — 83036 HEMOGLOBIN GLYCOSYLATED A1C: CPT

## 2021-03-22 PROCEDURE — 93005 ELECTROCARDIOGRAM TRACING: CPT

## 2021-03-22 PROCEDURE — U0003 INFECTIOUS AGENT DETECTION BY NUCLEIC ACID (DNA OR RNA); SEVERE ACUTE RESPIRATORY SYNDROME CORONAVIRUS 2 (SARS-COV-2) (CORONAVIRUS DISEASE [COVID-19]), AMPLIFIED PROBE TECHNIQUE, MAKING USE OF HIGH THROUGHPUT TECHNOLOGIES AS DESCRIBED BY CMS-2020-01-R: HCPCS

## 2021-03-22 PROCEDURE — 36415 COLL VENOUS BLD VENIPUNCTURE: CPT

## 2021-03-23 LAB
ATRIAL RATE: 74 BPM
CALCULATED P AXIS, ECG09: 49 DEGREES
CALCULATED R AXIS, ECG10: -27 DEGREES
CALCULATED T AXIS, ECG11: 49 DEGREES
DIAGNOSIS, 93000: NORMAL
P-R INTERVAL, ECG05: 160 MS
Q-T INTERVAL, ECG07: 382 MS
QRS DURATION, ECG06: 106 MS
QTC CALCULATION (BEZET), ECG08: 424 MS
SARS-COV-2, COV2NT: NOT DETECTED
VENTRICULAR RATE, ECG03: 74 BPM

## 2021-03-24 NOTE — H&P
[facility] [place] [id number]  Tel: [phone]  Fax: [phone]    Patient : Rocio Scott   YOB: 1955   Birth Sex: Male      Current Gender: Male      Date:  03/22/2021 9:50 AM    Visit Type:   Office Visit   Assessment/Plan  # Detail Type Description    Assessment Calculus of kidney (N20.0). Patient Plan Patient with a 4 mm upper pole stone left kidney will attempt to remove this at the same time we treat ureteral stone. Plan Orders UA In Office No Micro to be performed. 2. Assessment Hydronephrosis with renal and ureteral calculous obstruction (N13.2). Patient Plan   Patient with large upper ureteral stone on the left side. Reviewed options including lithotripsy versus ureteroscopy. At this time he has elected to undergo ureteroscopic stone extraction. All risks including pain infection bleeding need for stent ureteral injury reviewed for patient and his wife he understands will proceed. In addition patient given Norco prescription and instructed to use ibuprofen as well         3. Other Orders Orders not associated to today's assessments. Plan Orders Further diagnostic evaluations ordered today include(s) EKG For Intial Preventative Medicine to be performed. Additional Visit Information      This 73year old male presents for Erectile Dysfunction, Elevated PSA Uro and Kidney and/or Ureteral Stone. History of Present Illness  1. Erectile Dysfunction   The symptoms began gradually, have been mild and are improving. The patient is here today for a follow up visit. The patient states he does have difficultly attaining an erection and has difficulty maintaining an erection. Pertinent history includes hypertension but not diabetes or prostate cancer. Reviewed today was a Hematocrit taken on 08/22/2019 with findings of 43%. Testosterone taken on 08/22/2019 with findings of 494 ng/dL.  PSA taken on 08/22/2019 with findings of 2.292 ng/mL. He also complains of difficulty achieving erections and difficulty maintaining erections but denies depression, morning erections or nocturnal erections. Additional information: Pt with sxs of decreased quality of erection he has DM which is probably the cause of his ED. He is on 4 pumps of Fortesta, his levels stable Viagra is working well. 8/27/19  Pt doing well on TRT as well as Sildenafil. He has some delayed response. He will try Cialis 20mg. His T level is good and PSA is stable. He will f/u in one year T HCT PSA.            08/31/2020   Patient is using Klaus Krill which he is working well his T level is 6645. His hematocrit is at 42.4%. No changes seem to be needed today. 2.  Elevated PSA Uro   The onset of symptoms was sudden. The problem has improved. Reviewed today was a PSA taken on 08/22/2019 with findings of 2.292 ng/mL. Pertinent history includes age over 48. The patient does not report modifying factors such as medication, instrumentation, recent ejaculation or recent 5 MICHAEL. Pertinent negatives include nocturia, pressure and urinary frequency. Additional information: Pt had sudden spike in PSA at last visit. It has now been steadily decreasing. it is now back to baseline 2.346. I will repeat one year. .            8/31/2020  Patient with a history of elevated PSA. At this point tends to bounce up and down. Is currently at 3.078 which is stable. No intervention is needed. 3.  Kidney and/or Ureteral Stone   Onset was sudden. Severity level is severe. It occurs intermittently. The problem is with no change. Location of pain is left flank. Pertinent negatives include chills, constipation, diarrhea, fever, nausea, frequency (urinary) and vomiting. Additional information: Patient with left flank pain CT on 03/19/2021 revealing 11 x 5 x 8 proximal left ureteral stone mild proximal left hydroureter hydronephrosis and a 4 mm stone in the upper pole left kidney.   Small umbilical hernia. .          Past Medical/Surgical History   (Reviewed, updated)  Disease/disorder Onset Date Management Date Comments     Vasectomy 1987    Diabetes           Problem List  Problem List reviewed. Problem Description Onset Date Chronic Clinical Status Notes   Type II diabetes mellitus uncontrolled 05/02/2012 Y     Hyperlipidemia 05/12/2014 Y       Medications (active prior to today)  Medication Instructions Start Date Stop Date Refilled Elsewhere   Aspir-81 81 mg Tab take 1 tablet (81MG)  by oral route  every day //   Y   Actos 30 mg tablet take 1 tablet by oral route  every day 03/20/2019  10/07/2019 N   Fortesta 10 mg/0.5 gram/actuation transdermal gel pump apply 2 pumps by topical route  every day each thigh, total of 4 qam 03/24/2020 03/24/2020 N   metformin 1,000 mg tablet take 1 tablet by oral route 2 times every day with morning and evening meals 07/17/2020 07/17/2020 N   Pravachol 40 mg tablet take 1 tablet by oral route  every bedtime 10/28/2020  10/28/2020 N   Actos 30 mg tablet take 1 tablet by oral route  every day as needed for e11.65 11/16/2020   N   Actos 30 mg tablet take 1 tablet by oral route  every day 11/17/2020 11/17/2020 N   tadalafil 20 mg tablet TAKE 1 TABLET BY MOUTH 1 HOUR PRIOR TO INTERCOURSE AS NEEDED 03/03/2021 03/03/2021 N       Medication Reconciliation  Medications reconciled today.     Medication Reviewed  Adherence Medication Name Sig Desc Elsewhere Status   taking as directed Pravachol 40 mg tablet take 1 tablet by oral route  every bedtime N Verified   taking as directed tadalafil 20 mg tablet TAKE 1 TABLET BY MOUTH 1 HOUR PRIOR TO INTERCOURSE AS NEEDED N Verified   taking as directed metformin 1,000 mg tablet take 1 tablet by oral route 2 times every day with morning and evening meals N Verified   taking as directed Aspir-81 81 mg Tab take 1 tablet (81MG)  by oral route  every day Y Verified   taking as directed Fortesta 10 mg/0.5 gram/actuation transdermal gel pump apply 2 pumps by topical route  every day each thigh, total of 4 qam N Verified   taking as directed Actos 30 mg tablet take 1 tablet by oral route  every day N Verified   taking as directed Actos 30 mg tablet take 1 tablet by oral route  every day as needed for e11.65 N Verified   taking as directed Actos 30 mg tablet take 1 tablet by oral route  every day N Verified     Allergies  Ingredient Reaction (Severity) Medication Name Comment   PENICILLINS      SIMVASTATIN Muscular pain ZOCOR      Reviewed, no changes. Family History   (Reviewed, updated)    Relationship Family Member Name  Age at Death Condition Onset Age Cause of Death       Family history of Coronary artery disease  N       Family history of Diabetes mellitus  N   Brother  N  Alive and well  N   Father  Y [de-identified]   N   Mother  Y 61   N     Social History  (Reviewed, updated)  Tobacco use reviewed. Preferred language is [place]. The patient does not need an . Marital Status/Family/Social Support  Marital status:      Tobacco use status: Ex-cigar smoker. Smoking status: Former smoker. Tobacco Screening  Patient has used tobacco.     Smoking Status  Type Smoking Status Usage Per Day Years Used Pack Years Total Pack Years   [place] Former smoker       Cigar Former smoker         Tobacco/Vaping Exposure  There is passive smoke exposure. Alcohol  There is a history of alcohol use. Type: Beer. consumed occasionally. Caffeine  The patient uses caffeine: coffee - > 32oz a day. Review of Systems  System Neg/Pos Details   Constitutional Negative Chills and Fever. ENMT Negative Ear infections and Sore throat. Eyes Negative Blurred vision, Double vision and Eye pain. Respiratory Negative Asthma, Chronic cough, Dyspnea and Wheezing. Cardio Negative Chest pain. GI Negative Constipation, Decreased appetite, Diarrhea, Nausea and Vomiting.     Positive Difficulty achieving erections, Difficulty maintaining erections.  Negative Morning erections, Nocturia, Nocturnal erections, Pressure and Urinary frequency. Endocrine Negative Cold intolerance, Heat intolerance, Increased thirst and Weight loss. Neuro Negative Headache and Tremors. Psych Negative Anxiety and Depression. Integumentary Negative Itching skin and Rash. MS Negative Back pain and Joint pain. Hema/Lymph Negative Easy bleeding. Reproductive Negative Sexual dysfunction. Vital Signs   Height  Time ft in cm Last Measured Height Position   10:09 AM 5.0 11.00 180.34 05/21/2019 Standing     Weight/BSA/BMI  Time lb oz kg Context BMI kg/m2 BSA m2   10:09 .00  94.801  29.15      Measured by  Time Measured by   10:09 AM Hien Obrien     Physical Exam  Exam Findings Details   Constitutional Normal Well developed. Neck Exam Normal Inspection - Normal.   Respiratory Normal Inspection - Normal.   Extremity Normal No edema. Neurological Normal Alert and oriented to person, place and time. Cranial nerves intact. No motor or sensory deficits. Psychiatric Normal Orientation - Oriented to time, place, person & situation. Appropriate mood and affect. Immunizations Entered by History  Date Immunization   10/30/2020 12:00:00 AM Influenza, injectable, Madin Kizzy Canine Kidney,  quadrivalent with preservative   10/30/2018 12:00:00 AM Influenza, seasonal, injectable   10/30/2017 12:00:00 AM Influenza, seasonal, injectable   10/14/2016 12:00:00 AM Influenza, seasonal, injectable   Patient Education  # Patient Education   1.  Kidney Stone: Care Instructions       Medications (added, continued, or stopped today)  Start Date Medication Directions PRN Status PRN Reason Instruction Stop Date   03/20/2019 Actos 30 mg tablet take 1 tablet by oral route  every day N      11/16/2020 Actos 30 mg tablet take 1 tablet by oral route  every day as needed for e11.65 Y e11.65     11/17/2020 Actos 30 mg tablet take 1 tablet by oral route  every day N       Aspir-81 81 mg Tab take 1 tablet (81MG)  by oral route  every day N      03/24/2020 Fortesta 10 mg/0.5 gram/actuation transdermal gel pump apply 2 pumps by topical route  every day each thigh, total of 4 qam N      03/22/2021 hydrocodone 5 mg-acetaminophen 325 mg tablet take 1 tablet by oral route  every 4 hours as needed for pain N      07/17/2020 metformin 1,000 mg tablet take 1 tablet by oral route 2 times every day with morning and evening meals N      10/28/2020 Pravachol 40 mg tablet take 1 tablet by oral route  every bedtime N      03/03/2021 tadalafil 20 mg tablet TAKE 1 TABLET BY MOUTH 1 HOUR PRIOR TO INTERCOURSE AS NEEDED N        Prescription Drug Monitoring Report: Accessed by Deborah Shahid MD on 3/22/2021 10:18:43 AM    Active Patient Care Team Members  Name Contact Agency Type Support Role Relationship Active Date Inactive Date Specialty   [facility]   [facility] provider    Urology   Chloé Castro III   Patient provider PCP   Internal Medicine       Provider:    Deborah Shahid MD 03/22/2021 12:50 PM     Document generated by:  Cat Durand 03/22/2021 12:50 PM      ----------------------------------------------------------------------------------------------------------------------------------------------------------------------      Electronically signed by Deborah Shahid MD on 03/22/2021 06:27 PM

## 2021-03-25 ENCOUNTER — HOSPITAL ENCOUNTER (OUTPATIENT)
Age: 66
Setting detail: OUTPATIENT SURGERY
Discharge: HOME | End: 2021-03-25
Attending: UROLOGY | Admitting: UROLOGY
Payer: MEDICARE

## 2021-03-25 ENCOUNTER — APPOINTMENT (OUTPATIENT)
Dept: GENERAL RADIOLOGY | Age: 66
End: 2021-03-25
Attending: UROLOGY
Payer: MEDICARE

## 2021-03-25 ENCOUNTER — ANESTHESIA EVENT (OUTPATIENT)
Dept: SURGERY | Age: 66
End: 2021-03-25
Payer: MEDICARE

## 2021-03-25 ENCOUNTER — ANESTHESIA (OUTPATIENT)
Dept: SURGERY | Age: 66
End: 2021-03-25
Payer: MEDICARE

## 2021-03-25 VITALS
RESPIRATION RATE: 16 BRPM | OXYGEN SATURATION: 97 % | HEART RATE: 64 BPM | TEMPERATURE: 97.7 F | DIASTOLIC BLOOD PRESSURE: 79 MMHG | HEIGHT: 71 IN | BODY MASS INDEX: 29.57 KG/M2 | WEIGHT: 211.25 LBS | SYSTOLIC BLOOD PRESSURE: 134 MMHG

## 2021-03-25 LAB
GLUCOSE BLD STRIP.AUTO-MCNC: 114 MG/DL (ref 70–110)
GLUCOSE BLD STRIP.AUTO-MCNC: 135 MG/DL (ref 70–110)

## 2021-03-25 PROCEDURE — 77030010103 HC SEAL PRT ENDOSC OCOA -B: Performed by: UROLOGY

## 2021-03-25 PROCEDURE — C2617 STENT, NON-COR, TEM W/O DEL: HCPCS | Performed by: UROLOGY

## 2021-03-25 PROCEDURE — 77030040361 HC SLV COMPR DVT MDII -B: Performed by: UROLOGY

## 2021-03-25 PROCEDURE — 74420 UROGRAPHY RTRGR +-KUB: CPT

## 2021-03-25 PROCEDURE — 76010000149 HC OR TIME 1 TO 1.5 HR: Performed by: UROLOGY

## 2021-03-25 PROCEDURE — C1894 INTRO/SHEATH, NON-LASER: HCPCS | Performed by: UROLOGY

## 2021-03-25 PROCEDURE — 77030006974 HC BSKT URET RTVR BSC -C: Performed by: UROLOGY

## 2021-03-25 PROCEDURE — 76210000026 HC REC RM PH II 1 TO 1.5 HR: Performed by: UROLOGY

## 2021-03-25 PROCEDURE — 77030020782 HC GWN BAIR PAWS FLX 3M -B: Performed by: UROLOGY

## 2021-03-25 PROCEDURE — 77030020268 HC MISC GENERAL SUPPLY: Performed by: UROLOGY

## 2021-03-25 PROCEDURE — 2709999900 HC NON-CHARGEABLE SUPPLY: Performed by: UROLOGY

## 2021-03-25 PROCEDURE — 74011250636 HC RX REV CODE- 250/636: Performed by: NURSE ANESTHETIST, CERTIFIED REGISTERED

## 2021-03-25 PROCEDURE — 82360 CALCULUS ASSAY QUANT: CPT

## 2021-03-25 PROCEDURE — 74011250636 HC RX REV CODE- 250/636: Performed by: UROLOGY

## 2021-03-25 PROCEDURE — 77030014023 HC SYR INFL LVEEN BSC -B: Performed by: UROLOGY

## 2021-03-25 PROCEDURE — C1769 GUIDE WIRE: HCPCS | Performed by: UROLOGY

## 2021-03-25 PROCEDURE — 76060000033 HC ANESTHESIA 1 TO 1.5 HR: Performed by: UROLOGY

## 2021-03-25 PROCEDURE — 74011000636 HC RX REV CODE- 636: Performed by: UROLOGY

## 2021-03-25 PROCEDURE — 82962 GLUCOSE BLOOD TEST: CPT

## 2021-03-25 PROCEDURE — 77030038846 HC SCPE URETSCP LITHOVUE DISP BSC -H: Performed by: UROLOGY

## 2021-03-25 PROCEDURE — C1726 CATH, BAL DIL, NON-VASCULAR: HCPCS | Performed by: UROLOGY

## 2021-03-25 PROCEDURE — 77030012508 HC MSK AIRWY LMA AMBU -A: Performed by: NURSE ANESTHETIST, CERTIFIED REGISTERED

## 2021-03-25 PROCEDURE — 74011000250 HC RX REV CODE- 250: Performed by: NURSE ANESTHETIST, CERTIFIED REGISTERED

## 2021-03-25 PROCEDURE — C1758 CATHETER, URETERAL: HCPCS | Performed by: UROLOGY

## 2021-03-25 PROCEDURE — 76210000006 HC OR PH I REC 0.5 TO 1 HR: Performed by: UROLOGY

## 2021-03-25 PROCEDURE — 74011250637 HC RX REV CODE- 250/637: Performed by: ANESTHESIOLOGY

## 2021-03-25 DEVICE — URETERAL STENT
Type: IMPLANTABLE DEVICE | Site: URETER | Status: FUNCTIONAL
Brand: POLARIS™ ULTRA

## 2021-03-25 RX ORDER — INSULIN LISPRO 100 [IU]/ML
INJECTION, SOLUTION INTRAVENOUS; SUBCUTANEOUS ONCE
Status: DISCONTINUED | OUTPATIENT
Start: 2021-03-25 | End: 2021-03-25 | Stop reason: HOSPADM

## 2021-03-25 RX ORDER — PROPOFOL 10 MG/ML
INJECTION, EMULSION INTRAVENOUS AS NEEDED
Status: DISCONTINUED | OUTPATIENT
Start: 2021-03-25 | End: 2021-03-25 | Stop reason: HOSPADM

## 2021-03-25 RX ORDER — ALBUTEROL SULFATE 0.83 MG/ML
2.5 SOLUTION RESPIRATORY (INHALATION) AS NEEDED
Status: DISCONTINUED | OUTPATIENT
Start: 2021-03-25 | End: 2021-03-25 | Stop reason: HOSPADM

## 2021-03-25 RX ORDER — DEXTROSE MONOHYDRATE 100 MG/ML
125-250 INJECTION, SOLUTION INTRAVENOUS AS NEEDED
Status: DISCONTINUED | OUTPATIENT
Start: 2021-03-25 | End: 2021-03-25 | Stop reason: HOSPADM

## 2021-03-25 RX ORDER — FENTANYL CITRATE 50 UG/ML
25 INJECTION, SOLUTION INTRAMUSCULAR; INTRAVENOUS
Status: DISCONTINUED | OUTPATIENT
Start: 2021-03-25 | End: 2021-03-25 | Stop reason: HOSPADM

## 2021-03-25 RX ORDER — SODIUM CHLORIDE, SODIUM LACTATE, POTASSIUM CHLORIDE, CALCIUM CHLORIDE 600; 310; 30; 20 MG/100ML; MG/100ML; MG/100ML; MG/100ML
150 INJECTION, SOLUTION INTRAVENOUS CONTINUOUS
Status: DISCONTINUED | OUTPATIENT
Start: 2021-03-25 | End: 2021-03-25 | Stop reason: HOSPADM

## 2021-03-25 RX ORDER — IBUPROFEN 200 MG
200 TABLET ORAL
COMMUNITY

## 2021-03-25 RX ORDER — SODIUM CHLORIDE, SODIUM LACTATE, POTASSIUM CHLORIDE, CALCIUM CHLORIDE 600; 310; 30; 20 MG/100ML; MG/100ML; MG/100ML; MG/100ML
125 INJECTION, SOLUTION INTRAVENOUS CONTINUOUS
Status: DISCONTINUED | OUTPATIENT
Start: 2021-03-25 | End: 2021-03-25 | Stop reason: HOSPADM

## 2021-03-25 RX ORDER — MIDAZOLAM HYDROCHLORIDE 1 MG/ML
INJECTION, SOLUTION INTRAMUSCULAR; INTRAVENOUS AS NEEDED
Status: DISCONTINUED | OUTPATIENT
Start: 2021-03-25 | End: 2021-03-25 | Stop reason: HOSPADM

## 2021-03-25 RX ORDER — MAGNESIUM SULFATE 100 %
4 CRYSTALS MISCELLANEOUS AS NEEDED
Status: DISCONTINUED | OUTPATIENT
Start: 2021-03-25 | End: 2021-03-25 | Stop reason: HOSPADM

## 2021-03-25 RX ORDER — NALOXONE HYDROCHLORIDE 0.4 MG/ML
0.1 INJECTION, SOLUTION INTRAMUSCULAR; INTRAVENOUS; SUBCUTANEOUS AS NEEDED
Status: DISCONTINUED | OUTPATIENT
Start: 2021-03-25 | End: 2021-03-25 | Stop reason: HOSPADM

## 2021-03-25 RX ORDER — HYDROMORPHONE HYDROCHLORIDE 1 MG/ML
0.2 INJECTION, SOLUTION INTRAMUSCULAR; INTRAVENOUS; SUBCUTANEOUS AS NEEDED
Status: DISCONTINUED | OUTPATIENT
Start: 2021-03-25 | End: 2021-03-25 | Stop reason: HOSPADM

## 2021-03-25 RX ORDER — HYDROCODONE BITARTRATE AND ACETAMINOPHEN 5; 325 MG/1; MG/1
1 TABLET ORAL AS NEEDED
Status: DISCONTINUED | OUTPATIENT
Start: 2021-03-25 | End: 2021-03-25 | Stop reason: HOSPADM

## 2021-03-25 RX ORDER — LEVOFLOXACIN 5 MG/ML
500 INJECTION, SOLUTION INTRAVENOUS ONCE
Status: COMPLETED | OUTPATIENT
Start: 2021-03-25 | End: 2021-03-25

## 2021-03-25 RX ORDER — LIDOCAINE HYDROCHLORIDE 20 MG/ML
INJECTION, SOLUTION EPIDURAL; INFILTRATION; INTRACAUDAL; PERINEURAL AS NEEDED
Status: DISCONTINUED | OUTPATIENT
Start: 2021-03-25 | End: 2021-03-25 | Stop reason: HOSPADM

## 2021-03-25 RX ORDER — FENTANYL CITRATE 50 UG/ML
INJECTION, SOLUTION INTRAMUSCULAR; INTRAVENOUS AS NEEDED
Status: DISCONTINUED | OUTPATIENT
Start: 2021-03-25 | End: 2021-03-25 | Stop reason: HOSPADM

## 2021-03-25 RX ORDER — ONDANSETRON 2 MG/ML
4 INJECTION INTRAMUSCULAR; INTRAVENOUS ONCE
Status: DISCONTINUED | OUTPATIENT
Start: 2021-03-25 | End: 2021-03-25 | Stop reason: HOSPADM

## 2021-03-25 RX ORDER — DIPHENHYDRAMINE HYDROCHLORIDE 50 MG/ML
12.5 INJECTION, SOLUTION INTRAMUSCULAR; INTRAVENOUS
Status: DISCONTINUED | OUTPATIENT
Start: 2021-03-25 | End: 2021-03-25 | Stop reason: HOSPADM

## 2021-03-25 RX ORDER — SODIUM CHLORIDE 0.9 % (FLUSH) 0.9 %
5-40 SYRINGE (ML) INJECTION EVERY 8 HOURS
Status: DISCONTINUED | OUTPATIENT
Start: 2021-03-25 | End: 2021-03-25 | Stop reason: HOSPADM

## 2021-03-25 RX ORDER — SODIUM CHLORIDE 0.9 % (FLUSH) 0.9 %
5-40 SYRINGE (ML) INJECTION AS NEEDED
Status: DISCONTINUED | OUTPATIENT
Start: 2021-03-25 | End: 2021-03-25 | Stop reason: HOSPADM

## 2021-03-25 RX ORDER — ONDANSETRON 2 MG/ML
INJECTION INTRAMUSCULAR; INTRAVENOUS AS NEEDED
Status: DISCONTINUED | OUTPATIENT
Start: 2021-03-25 | End: 2021-03-25 | Stop reason: HOSPADM

## 2021-03-25 RX ADMIN — FENTANYL CITRATE 50 MCG: 50 INJECTION, SOLUTION INTRAMUSCULAR; INTRAVENOUS at 11:14

## 2021-03-25 RX ADMIN — SODIUM CHLORIDE, SODIUM LACTATE, POTASSIUM CHLORIDE, AND CALCIUM CHLORIDE 125 ML/HR: 600; 310; 30; 20 INJECTION, SOLUTION INTRAVENOUS at 09:55

## 2021-03-25 RX ADMIN — SODIUM CHLORIDE, SODIUM LACTATE, POTASSIUM CHLORIDE, AND CALCIUM CHLORIDE: 600; 310; 30; 20 INJECTION, SOLUTION INTRAVENOUS at 11:15

## 2021-03-25 RX ADMIN — LEVOFLOXACIN 500 MG: 5 INJECTION, SOLUTION INTRAVENOUS at 10:58

## 2021-03-25 RX ADMIN — LIDOCAINE HYDROCHLORIDE 80 MG: 20 INJECTION, SOLUTION EPIDURAL; INFILTRATION; INTRACAUDAL; PERINEURAL at 11:06

## 2021-03-25 RX ADMIN — MIDAZOLAM 2 MG: 1 INJECTION INTRAMUSCULAR; INTRAVENOUS at 10:58

## 2021-03-25 RX ADMIN — ONDANSETRON HYDROCHLORIDE 4 MG: 2 INJECTION INTRAMUSCULAR; INTRAVENOUS at 11:55

## 2021-03-25 RX ADMIN — PROPOFOL 200 MG: 10 INJECTION, EMULSION INTRAVENOUS at 11:06

## 2021-03-25 RX ADMIN — HYDROCODONE BITARTRATE AND ACETAMINOPHEN 1 TABLET: 5; 325 TABLET ORAL at 13:32

## 2021-03-25 NOTE — PERIOP NOTES
Reviewed PTA medication list with patient/caregiver and patient/caregiver denies any additional medications. Patient admits to having a responsible adult care for them at home for at least 24 hours after surgery. Patient encouraged to use gown warming system and informed that using said warming gown to regulate body temperature prior to a procedure has been shown to help reduce the risks of blood clots and infection. Patient's pharmacy of choice verified and documented in PTA medication section. Dual skin assessment & fall risk band verification completed with Jamaal Jackson RN.

## 2021-03-25 NOTE — ANESTHESIA POSTPROCEDURE EVALUATION
Procedure(s):  CYSTOSCOPY,LEFT RETROGRADE PYELOGRAM,LEFT  URETEROSCOPY WITH HOLMIUM LASER LITHOTRIPSY AND STENT PLACEMENT. general    Anesthesia Post Evaluation      Multimodal analgesia: multimodal analgesia used between 6 hours prior to anesthesia start to PACU discharge  Patient location during evaluation: PACU  Patient participation: complete - patient participated  Level of consciousness: awake  Pain management: adequate  Airway patency: patent  Anesthetic complications: no  Cardiovascular status: acceptable  Respiratory status: acceptable  Hydration status: acceptable  Post anesthesia nausea and vomiting:  none  Final Post Anesthesia Temperature Assessment:  Normothermia (36.0-37.5 degrees C)      INITIAL Post-op Vital signs:   Vitals Value Taken Time   /68 03/25/21 1256   Temp 36.7 °C (98 °F) 03/25/21 1232   Pulse 59 03/25/21 1257   Resp 13 03/25/21 1257   SpO2 97 % 03/25/21 1257   Vitals shown include unvalidated device data.

## 2021-03-25 NOTE — ANESTHESIA PREPROCEDURE EVALUATION
Relevant Problems   No relevant active problems       Anesthetic History   No history of anesthetic complications            Review of Systems / Medical History  Patient summary reviewed, nursing notes reviewed and pertinent labs reviewed    Pulmonary  Within defined limits                 Neuro/Psych       CVA  TIA     Cardiovascular                  Exercise tolerance: >4 METS     GI/Hepatic/Renal     GERD           Endo/Other    Diabetes         Other Findings              Physical Exam    Airway  Mallampati: II  TM Distance: 4 - 6 cm  Neck ROM: normal range of motion   Mouth opening: Normal     Cardiovascular  Regular rate and rhythm,  S1 and S2 normal,  no murmur, click, rub, or gallop             Dental  No notable dental hx       Pulmonary  Breath sounds clear to auscultation               Abdominal  GI exam deferred       Other Findings            Anesthetic Plan    ASA: 2  Anesthesia type: general          Induction: Intravenous  Anesthetic plan and risks discussed with: Patient

## 2021-03-25 NOTE — BRIEF OP NOTE
Brief Postoperative Note    Patient: Rober Watkins  YOB: 1955  MRN: [id number]    Date of Procedure: 3/25/2021     Pre-Op Diagnosis: LEFT URETERAL STONE, LEFT hydronephrosis, KIDNEY STONE    Post-Op Diagnosis: Same as preoperative diagnosis. Procedure(s):  CYSTOSCOPY,LEFT RETROGRADE PYELOGRAM,LEFT  URETEROSCOPY WITH HOLMIUM LASER LITHOTRIPSY AND STENT PLACEMENT    Surgeon(s):  Lester Jordan MD    Surgical Assistant: Surg Asst-1: Apurva Doran    Anesthesia: Other     Estimated Blood Loss (mL): Minimal    Complications: None    Specimens:   ID Type Source Tests Collected by Time Destination   1 : LEFT URETERAL STONE Fresh URETER, LEFT  Lester Jordan MD 3/25/2021 1205 Pathology        Implants:   Implant Name Type Inv.  Item Serial No.  Lot No. LRB No. Used Action   STENT URET POLARIS ULTRA 5X24 -- PERCUFLEX - SN/A  STENT URET POLARIS ULTRA 5X24 -- PERCUFLEX N/A [employer] SCI UROLOGY_WD [id number] Left 1 Implanted       Drains: * No LDAs found *    Findings: one large upper ureteral stone, left renal stone    Electronically Signed by James Hobson MD on 3/25/2021 at 12:15 PM

## 2021-03-25 NOTE — OP NOTES
Postoperative Note     Patient: Nicholas Kussmaul  YOB: 1955  MRN: [id number]     Date of Procedure: 3/25/2021      Pre-Op Diagnosis: LEFT URETERAL STONE, LEFT hydronephrosis, KIDNEY STONE     Post-Op Diagnosis: Same as preoperative diagnosis.       Procedure(s):  CYSTOSCOPY,LEFT RETROGRADE PYELOGRAM,LEFT  URETEROSCOPY WITH HOLMIUM LASER LITHOTRIPSY AND STENT PLACEMENT     Surgeon(s):  Parul Cabrera MD     Surgical Assistant: Surg Asst-1: Albert Pepper  Anesthesia: Other      Estimated Blood Loss (mL): Minimal     Complications: None     Specimens:   ID Type Source Tests Collected by Time Destination   1 : LEFT URETERAL STONE Fresh URETER, LEFT   Parul Cabrera MD 3/25/2021 1205 Pathology         Implants:   Implant Name Type Inv. Item Serial No.  Lot No. LRB No. Used Action   STENT URET POLARIS ULTRA 5X24 -- PERCUFLEX - SN/A   STENT URET POLARIS ULTRA 5X24 -- PERCUFLEX N/A [employer] UROLOGY_WD [id number] Left 1 Implanted         Drains: * No LDAs found *     Findings: one large upper ureteral stone, left renal stone         Procedure Details: The patient was seen in the pre-operative area. The risks, benefits, complications, alternative treatment options, and expected outcomes were again discussed with the patient. The possibilities of reaction to medication, pain, infection, bleeding, major cardiovascular event, death, damage to surrounding structures were specifically addressed. Informed consent was then obtained. The site of surgery properly noted/marked. Patient brought in the operating room and placed   in supine position. After administration of general anesthesia, he was   placed in lithotomy position. Groin and genitalia prepped and draped in the   usual sterile fashion. I began with a 21-[place] cystoscope. Cystourethroscopy was performed. I identified the LEFT ureteral orifice.    I was able to intubate the ureteral orifice with an open-ended   catheter and retrograde pyelogram was performed. There was a filling defect in proximal ureter with proximal moderate hydronephrosis. I then used a 0.035 sensor   wire, intubated the open-ended catheter, placed this into the collecting system bypassing the stone without difficultly. I then used a 10-cm ureteral balloon dilator and dilated the lower ureter. Then using a dual-lumen catheter, a second wire was placed into the collecting system and an 11 x 13 x 43 Navigator sheath was placed over one of   the wires. The sheath and the wire were removed. The stone in the upper ureter then migrated up into the collecting system. So at this point, we had a   wire in place in the collecting system and the Navigator sheath. I then   used a flexible ureteroscope, traversed the ureter up to the collecting   system. Using holmium laser, I fragmented and dusted the stone. Using saline, I then flushed the stone out of the collecting system. Using a 0 tip basket, I   removed the largest of the stone fragments,which will be sent to pathology for analysis. On fluoroscopy, there was no evidence of any residual stone. The wire remained in place. I removed the ureteroscope and the Navigator sheath under direct   vision and using cystoscopic guidance, a 5 x 24 double-J stent was placed   over the wire into the collecting system. I removed the   wire. There was a good coil in the kidney and a good coil in the bladder. The bladder was emptied. The patient tolerated the procedure well. The patient was   transferred to recovery room in stable condition.            Bri Hinkle MD  3/25/2021  12:34 PM

## 2021-03-25 NOTE — PERIOP NOTES
Discharge instructions completed via phone call to [address] [place] [address] - opportunity for questions - AVS med list reviewed for duplicates

## 2021-03-25 NOTE — DISCHARGE INSTRUCTIONS
Tito Barfield. Christiano Swenson M.D. [facility] FACILITY  [address] [facility], [facility]  Office: [phone]  Fax:    [phone]    PROCEDURE: Procedure(s):  CYSTOSCOPY,LEFT RETROGRADE PYELOGRAM,LEFT  URETEROSCOPY WITH HOLMIUM LASER LITHOTRIPSY AND [address],[facility] [place] [address]    Notify [facility] IMMEDIATELY if any of the following occur:     You are unable to urinate. Urgency to urinate is not uncommon.  You find yourself urinating small frequent amounts associated with severe lower abdominal discomfort.  Bright red blood with clots in the urine. Some reddish urine is not uncommon and should be treated with increasing the amount of fluids you drink.  Temperature above 101.5° and / or chills.  You are nauseous and / or vomiting and you cannot hold down any fluids.  Your pain is not controlled with the pain medication prescribed. Special Considerations:      Do not drive for at least 24 hours after the procedure and until you are no longer taking narcotic pain medication and you are able to move and react without hesitation. MEDICATIONS:  Pain   []  Norco®   []  Percocet® []  Dilaudid®    []  Tramadol   Antibiotics   []  Cipro   [x]  Keflex    [] Levaquin   []  Bactrim DS®       Urination   []  Vesicare®   []  Flomax     Burning   [x]  Pyridium®   []  UribelTM     Nausea   []  Zofran®   []  Phenergan®     Miscellaneous   []           [] Prescriptions Written on Chart    [x] Prescriptions sent Electronically           Our office will call you tomorrow to schedule your first follow-up appointment. Please contact [place] [address] [facility] at [phone] or go to the nearest Emergency Department / Urgent Care facility for any other medical questions or concerns.         DISCHARGE SUMMARY from Nurse    PATIENT INSTRUCTIONS:    After general anesthesia or intravenous sedation, for 24 hours or while taking prescription Narcotics:  · Limit your activities  · Do not drive and operate hazardous machinery  · Do not make important personal or business decisions  · Do  not drink alcoholic beverages  · If you have not urinated within 8 hours after discharge, please contact your surgeon on call. Report the following to your surgeon:  · Excessive pain, swelling, redness or odor of or around the surgical area  · Temperature over 100.5  · Nausea and vomiting lasting longer than 4 hours or if unable to take medications  · Any signs of decreased circulation or nerve impairment to extremity: change in color, persistent  numbness, tingling, coldness or increase pain  · Any questions    What to do at Home:  Recommended activity: Ambulate in house,     If you experience any of the following symptoms above, please follow up with Dr. David Plascencia. *  Please give a list of your current medications to your Primary Care Provider. *  Please update this list whenever your medications are discontinued, doses are      changed, or new medications (including over-the-counter products) are added. *  Please carry medication information at all times in case of emergency situations. These are general instructions for a healthy lifestyle:    No smoking/ No tobacco products/ Avoid exposure to second hand smoke  Surgeon General's Warning:  Quitting smoking now greatly reduces serious risk to your health. Obesity, smoking, and sedentary lifestyle greatly increases your risk for illness    A healthy diet, regular physical exercise & weight monitoring are important for maintaining a healthy lifestyle    You may be retaining fluid if you have a history of heart failure or if you experience any of the following symptoms:  Weight gain of 3 pounds or more overnight or 5 pounds in a week, increased swelling in our hands or feet or shortness of breath while lying flat in bed. Please call your doctor as soon as you notice any of these symptoms; do not wait until your next office visit.     Patient armband removed and shredded    The discharge information has been reviewed with the patient and caregiver. The patient and caregiver verbalized understanding. Discharge medications reviewed with the patient and caregiver and appropriate educational materials and side effects teaching were provided. Learning About Coronavirus [phone])  Coronavirus [phone]): Overview  What is coronavirus (COVID-19)? The coronavirus disease (COVID-19) is caused by a virus. It is an illness that was first found in [place], [place], in December 2019. It has since spread worldwide. The virus can cause fever, cough, and trouble breathing. In severe cases, it can cause pneumonia and make it hard to breathe without help. It can cause death. Coronaviruses are a large group of viruses. They cause the common cold. They also cause more serious illnesses like Middle East respiratory syndrome (MERS) and severe acute respiratory syndrome (SARS). COVID-19 is caused by a novel coronavirus. That means it's a new type that has not been seen in people before. This virus spreads person-to-person through droplets from coughing and sneezing. It can also spread when you are close to someone who is infected. And it can spread when you touch something that has the virus on it, such as a doorknob or a tabletop. What can you do to protect yourself from coronavirus (COVID-19)? The best way to protect yourself from getting sick is to:  · Avoid areas where there is an outbreak. · Avoid contact with people who may be infected. · Wash your hands often with soap or alcohol-based hand sanitizers. · Avoid crowds and try to stay at least 6 feet away from other people. · Wash your hands often, especially after you cough or sneeze. Use soap and water, and scrub for at least 20 seconds. If soap and water aren't available, use an alcohol-based hand . · Avoid touching your mouth, nose, and eyes. What can you do to avoid spreading the virus to others?   To help avoid spreading the virus to others:  · Cover your mouth with a tissue when you cough or sneeze. Then throw the tissue in the trash. · Use a disinfectant to clean things that you touch often. · Stay home if you are sick or have been exposed to the virus. Don't go to school, work, or public areas. And don't use public transportation. · If you are sick:  ? Leave your home only if you need to get medical care. But call the doctor's office first so they know you're coming. And wear a face mask, if you have one.  ? If you have a face mask, wear it whenever you're around other people. It can help stop the spread of the virus when you cough or sneeze. ? Clean and disinfect your home every day. Use household  and disinfectant wipes or sprays. Take special care to clean things that you grab with your hands. These include doorknobs, remote controls, phones, and handles on your refrigerator and microwave. And don't forget countertops, tabletops, bathrooms, and computer keyboards. When to call for help  Call 911 anytime you think you may need emergency care. For example, call if:  · You have severe trouble breathing. (You can't talk at all.)  · You have constant chest pain or pressure. · You are severely dizzy or lightheaded. · You are confused or can't think clearly. · Your face and lips have a blue color. · You pass out (lose consciousness) or are very hard to wake up. Call your doctor now if you develop symptoms such as:  · Shortness of breath. · Fever. · Cough. If you need to get care, call ahead to the doctor's office for instructions before you go. Make sure you wear a face mask, if you have one, to prevent exposing other people to the virus. Where can you get the latest information? The following health organizations are tracking and studying this virus. Their websites contain the most up-to-date information. Gregg Ao also learn what to do if you think you may have been exposed to the virus. · [place]  Centers for Disease Control and Prevention (CDC): The CDC provides updated news about the disease and travel advice. The website also tells you how to prevent the spread of infection. www.cdc.gov  · World Health Organization [facility]): WHO offers information about the virus outbreaks. WHO also has travel advice. www.who.int  Current as of: April 1, 2020               Content Version: 12.4  © 2053-1523 [employer]. Care instructions adapted under license by your healthcare professional. If you have questions about a medical condition or this instruction, always ask your healthcare professional. [address] any warranty or liability for your use of this information.     ___________________________________________________________________________________________________________________________________

## 2021-03-25 NOTE — PERIOP NOTES
Arrived to Phase 2 alert c/o needing to void - assisted to BR - 300 ml pink tinged urine noted in urinal labor/delivery

## 2021-03-25 NOTE — INTERVAL H&P NOTE
Update History & Physical 
 
The Patient's History and Physical of March 22, 2021 was reviewed with the patient and I examined the patient. There was no change. The surgical site was confirmed by the patient and me. Plan:  The risk, benefits, expected outcome, and alternative to the recommended procedure have been discussed with the patient. Patient understands and wants to proceed with the procedure.  
 
Electronically signed by Aye Mayers MD on 3/25/2021 at 10:47 AM

## 2021-03-31 LAB
COLOR STONE: NORMAL
COM MFR STONE: 100 %
COMMENT, 519994: NORMAL
COMPOSITION, 120440: NORMAL
DISCLAIMER, STO32L: NORMAL
NIDUS STONE QL: NORMAL
PHOTO, 120675: NORMAL
PLEASE NOTE, 130422: NORMAL
SHELL, 120438: NORMAL
SIZE STONE: NORMAL MM
SPECIMEN SOURCE: NORMAL
SURFACE CRYSTALS, 120439: NORMAL
WT STONE: 9 MG

## (undated) DEVICE — NDL PRT INJ NSAF BLNT 18GX1.5 --

## (undated) DEVICE — INFLATION DEVICE: Brand: ENCORE 26

## (undated) DEVICE — TRAP MUCUS SPECIMEN 40ML -- MEDICHOICE

## (undated) DEVICE — SPONGE GZ W4XL4IN COT 12 PLY TYP VII WVN C FLD DSGN

## (undated) DEVICE — NITINOL STONE RETRIEVAL BASKET: Brand: ZERO TIP

## (undated) DEVICE — TRNQT TEXT 1X18IN BLU LF DISP -- CONVERT TO ITEM 362165

## (undated) DEVICE — NDL FLTR TIP 5 MIC 18GX1.5IN --

## (undated) DEVICE — GARMENT,MEDLINE,DVT,INT,CALF,MED, GEN2: Brand: MEDLINE

## (undated) DEVICE — TRAP SPEC COLL POLYP POLYSTYR --

## (undated) DEVICE — CATH SUC CTRL PRT TRIFLO 14FR --

## (undated) DEVICE — SINGLE PORT MANIFOLD: Brand: NEPTUNE 2

## (undated) DEVICE — SOLUTION IRRIG 3000ML 0.9% SOD CHL FLX CONT 0797208] ICU MEDICAL INC]

## (undated) DEVICE — MEDI-VAC NON-CONDUCTIVE SUCTION TUBING: Brand: CARDINAL HEALTH

## (undated) DEVICE — KENDALL RADIOLUCENT FOAM MONITORING ELECTRODE RECTANGULAR SHAPE: Brand: KENDALL

## (undated) DEVICE — STRAP,POSITIONING,KNEE/BODY,FOAM,4X60": Brand: MEDLINE

## (undated) DEVICE — CYSTO PACK: Brand: MEDLINE INDUSTRIES, INC.

## (undated) DEVICE — MAJ-1414 SINGLE USE ADPATER BIOPSY VALV: Brand: SINGLE USE ADAPTOR BIOPSY VALVE

## (undated) DEVICE — CATHETER URET L70CM OD6FR OPN END FLEXIMA

## (undated) DEVICE — SPONGE GZ W4XL4IN RAYON POLY 4 PLY NONWOVEN FASTER WICKING

## (undated) DEVICE — GDWIRE 3CM FLX-TIP 0.038X150CM -- BX/5 SENSOR

## (undated) DEVICE — SYR 5ML 1/5 GRAD LL NSAF LF --

## (undated) DEVICE — URETERAL ACCESS SHEATH SET: Brand: NAVIGATOR HD

## (undated) DEVICE — CATH IV SAFE STR 22GX1IN BLU -- PROTECTIV PLUS

## (undated) DEVICE — BAG PRESSURE INFUSION 3 W STOPCOCK 3000 CC PREMIERPRO DISP

## (undated) DEVICE — WRISTBAND ID AD W2.5XL9.5CM RED VYN ADH CLSR UNI-PRINT

## (undated) DEVICE — STERILE LATEX POWDER-FREE SURGICAL GLOVESWITH NITRILE COATING: Brand: PROTEXIS

## (undated) DEVICE — SINGLE-USE DIGITAL FLEXIBLE URETEROSCOPE: Brand: LITHOVUE

## (undated) DEVICE — ENDO CARRY-ON PROCEDURE KIT INCLUDES ENZYMATIC SPONGE, GAUZE, BIOHAZARD LABEL, TRAY, LUBRICANT, DIRTY SCOPE LABEL, WATER LABEL, TRAY, DRAWSTRING PAD, AND DEFENDO 4-PIECE KIT.: Brand: ENDO CARRY-ON PROCEDURE KIT

## (undated) DEVICE — CANNULA CUSH AD W/ 14FT TBG

## (undated) DEVICE — Device

## (undated) DEVICE — SOLUTION IV 500ML 0.9% SOD CHL FLX CONT

## (undated) DEVICE — SEAL ENDOSCP INSTR 7FR BX SELF SEAL

## (undated) DEVICE — SET ADMIN 16ML TBNG L100IN 2 Y INJ SITE IV PIGGY BK DISP

## (undated) DEVICE — SYRINGE 50ML E/T

## (undated) DEVICE — BALLOON DILATATION CATHETER: Brand: UROMAX ULTRA

## (undated) DEVICE — SYR 3ML LL TIP 1/10ML GRAD --

## (undated) DEVICE — SNARE POLYP SM W13MMXL240CM SHTH DIA2.4MM OVL FLX DISP

## (undated) DEVICE — DUAL LUMEN URETERAL CATHETER